# Patient Record
Sex: MALE | Race: WHITE | Employment: FULL TIME | URBAN - METROPOLITAN AREA
[De-identification: names, ages, dates, MRNs, and addresses within clinical notes are randomized per-mention and may not be internally consistent; named-entity substitution may affect disease eponyms.]

---

## 2023-01-25 ENCOUNTER — APPOINTMENT (OUTPATIENT)
Dept: GENERAL RADIOLOGY | Age: 23
End: 2023-01-25

## 2023-01-25 ENCOUNTER — APPOINTMENT (OUTPATIENT)
Dept: CT IMAGING | Age: 23
End: 2023-01-25

## 2023-01-25 ENCOUNTER — HOSPITAL ENCOUNTER (EMERGENCY)
Age: 23
Discharge: HOME | End: 2023-01-25
Attending: EMERGENCY MEDICINE

## 2023-01-25 VITALS
RESPIRATION RATE: 16 BRPM | TEMPERATURE: 98.2 F | HEIGHT: 75 IN | OXYGEN SATURATION: 95 % | SYSTOLIC BLOOD PRESSURE: 127 MMHG | WEIGHT: 175 LBS | BODY MASS INDEX: 21.76 KG/M2 | DIASTOLIC BLOOD PRESSURE: 80 MMHG | HEART RATE: 87 BPM

## 2023-01-25 DIAGNOSIS — S60.221A CONTUSION OF RIGHT HAND, INITIAL ENCOUNTER: ICD-10-CM

## 2023-01-25 DIAGNOSIS — S06.0X0A CONCUSSION WITHOUT LOSS OF CONSCIOUSNESS, INITIAL ENCOUNTER: Primary | ICD-10-CM

## 2023-01-25 DIAGNOSIS — S00.83XA CONTUSION OF FACE, INITIAL ENCOUNTER: ICD-10-CM

## 2023-01-25 PROCEDURE — 70486 CT MAXILLOFACIAL W/O DYE: CPT

## 2023-01-25 PROCEDURE — 70450 CT HEAD/BRAIN W/O DYE: CPT

## 2023-01-25 PROCEDURE — 72125 CT NECK SPINE W/O DYE: CPT

## 2023-01-25 PROCEDURE — 99284 EMERGENCY DEPT VISIT MOD MDM: CPT

## 2023-01-25 PROCEDURE — 96372 THER/PROPH/DIAG INJ SC/IM: CPT

## 2023-01-25 PROCEDURE — 73130 X-RAY EXAM OF HAND: CPT

## 2023-01-25 PROCEDURE — 6360000002 HC RX W HCPCS

## 2023-01-25 RX ORDER — ONDANSETRON 4 MG/1
4 TABLET, FILM COATED ORAL 3 TIMES DAILY PRN
Qty: 15 TABLET | Refills: 0 | Status: SHIPPED | OUTPATIENT
Start: 2023-01-25

## 2023-01-25 RX ADMIN — TRIMETHOBENZAMIDE HYDROCHLORIDE 200 MG: 100 INJECTION INTRAMUSCULAR at 14:15
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
## 2023-01-25 NOTE — Clinical Note
Lexx Ding was seen and treated in our emergency department on 1/25/2023. He may return to work on 01/26/2023. If you have any questions or concerns, please don't hesitate to call.       Rejeana Sandifer, DO